# Patient Record
Sex: MALE | Race: OTHER | NOT HISPANIC OR LATINO
[De-identification: names, ages, dates, MRNs, and addresses within clinical notes are randomized per-mention and may not be internally consistent; named-entity substitution may affect disease eponyms.]

---

## 2018-11-05 PROBLEM — Z00.00 ENCOUNTER FOR PREVENTIVE HEALTH EXAMINATION: Status: ACTIVE | Noted: 2018-11-05

## 2018-11-16 DIAGNOSIS — Z87.891 PERSONAL HISTORY OF NICOTINE DEPENDENCE: ICD-10-CM

## 2018-11-16 DIAGNOSIS — Z87.74 PERSONAL HISTORY OF (CORRECTED) CONGENITAL MALFORMATIONS OF HEART AND CIRCULATORY SYSTEM: ICD-10-CM

## 2018-11-16 DIAGNOSIS — Q24.5 MALFORMATION OF CORONARY VESSELS: ICD-10-CM

## 2018-11-20 ENCOUNTER — APPOINTMENT (OUTPATIENT)
Dept: CARDIOTHORACIC SURGERY | Facility: CLINIC | Age: 63
End: 2018-11-20

## 2018-11-30 ENCOUNTER — APPOINTMENT (OUTPATIENT)
Dept: CARDIOTHORACIC SURGERY | Facility: CLINIC | Age: 63
End: 2018-11-30

## 2018-12-26 ENCOUNTER — APPOINTMENT (OUTPATIENT)
Dept: CARDIOTHORACIC SURGERY | Facility: CLINIC | Age: 63
End: 2018-12-26
Payer: COMMERCIAL

## 2018-12-26 VITALS
HEIGHT: 65 IN | HEART RATE: 98 BPM | SYSTOLIC BLOOD PRESSURE: 124 MMHG | DIASTOLIC BLOOD PRESSURE: 73 MMHG | WEIGHT: 147 LBS | RESPIRATION RATE: 16 BRPM | BODY MASS INDEX: 24.49 KG/M2 | OXYGEN SATURATION: 99 %

## 2018-12-26 DIAGNOSIS — Z80.9 FAMILY HISTORY OF MALIGNANT NEOPLASM, UNSPECIFIED: ICD-10-CM

## 2018-12-26 DIAGNOSIS — R07.9 CHEST PAIN, UNSPECIFIED: ICD-10-CM

## 2018-12-26 PROCEDURE — 99242 OFF/OP CONSLTJ NEW/EST SF 20: CPT

## 2018-12-26 RX ORDER — ANACARDIUM ORIENTALE, BELLADONNA, CACTUS GRANDIFLORUS, CALCAREA CARBONICA, CARBONEUM SULPHURATUM, CUPRUM ACETICUM, MAGNESIA PHOSPHORICA, SECALE CORNUTUM 6; 6; 6; 12; 10; 4; 8; 6 [HP_X]/ML; [HP_X]/ML; [HP_X]/ML; [HP_X]/ML; [HP_X]/ML; [HP_X]/ML; [HP_X]/ML; [HP_X]/ML
LIQUID ORAL
Refills: 0 | Status: ACTIVE | COMMUNITY

## 2019-01-16 ENCOUNTER — OUTPATIENT (OUTPATIENT)
Dept: OUTPATIENT SERVICES | Facility: HOSPITAL | Age: 64
LOS: 1 days | End: 2019-01-16
Payer: COMMERCIAL

## 2019-01-16 DIAGNOSIS — Q24.5 MALFORMATION OF CORONARY VESSELS: ICD-10-CM

## 2019-01-16 LAB
BUN SERPL-MCNC: 26 MG/DL — HIGH (ref 8–20)
CREAT SERPL-MCNC: 0.93 MG/DL — SIGNIFICANT CHANGE UP (ref 0.5–1.3)

## 2019-01-16 PROCEDURE — 75574 CT ANGIO HRT W/3D IMAGE: CPT | Mod: 26

## 2019-01-16 PROCEDURE — 93306 TTE W/DOPPLER COMPLETE: CPT | Mod: 26

## 2019-01-16 PROCEDURE — 84520 ASSAY OF UREA NITROGEN: CPT

## 2019-01-16 PROCEDURE — 93306 TTE W/DOPPLER COMPLETE: CPT

## 2019-01-16 PROCEDURE — 82565 ASSAY OF CREATININE: CPT

## 2019-01-16 PROCEDURE — 75574 CT ANGIO HRT W/3D IMAGE: CPT

## 2019-01-16 PROCEDURE — 36415 COLL VENOUS BLD VENIPUNCTURE: CPT

## 2019-08-27 ENCOUNTER — INPATIENT (INPATIENT)
Facility: HOSPITAL | Age: 64
LOS: 2 days | Discharge: HOME | DRG: 287 | End: 2019-08-30
Attending: THORACIC SURGERY (CARDIOTHORACIC VASCULAR SURGERY) | Admitting: THORACIC SURGERY (CARDIOTHORACIC VASCULAR SURGERY)
Payer: MEDICAID

## 2019-08-27 VITALS
TEMPERATURE: 98 F | RESPIRATION RATE: 20 BRPM | OXYGEN SATURATION: 98 % | HEART RATE: 68 BPM | HEIGHT: 65 IN | WEIGHT: 149.91 LBS | SYSTOLIC BLOOD PRESSURE: 145 MMHG | DIASTOLIC BLOOD PRESSURE: 95 MMHG

## 2019-08-27 DIAGNOSIS — Z98.890 OTHER SPECIFIED POSTPROCEDURAL STATES: Chronic | ICD-10-CM

## 2019-08-27 DIAGNOSIS — R07.9 CHEST PAIN, UNSPECIFIED: ICD-10-CM

## 2019-08-27 DIAGNOSIS — Q24.5 MALFORMATION OF CORONARY VESSELS: ICD-10-CM

## 2019-08-27 LAB
ALBUMIN SERPL ELPH-MCNC: 3.8 G/DL — SIGNIFICANT CHANGE UP (ref 3.3–5.2)
ALP SERPL-CCNC: 61 U/L — SIGNIFICANT CHANGE UP (ref 40–120)
ALT FLD-CCNC: 22 U/L — SIGNIFICANT CHANGE UP
ANION GAP SERPL CALC-SCNC: 11 MMOL/L — SIGNIFICANT CHANGE UP (ref 5–17)
APTT BLD: 33 SEC — SIGNIFICANT CHANGE UP (ref 27.5–36.3)
AST SERPL-CCNC: 32 U/L — SIGNIFICANT CHANGE UP
BASOPHILS # BLD AUTO: 0.02 K/UL — SIGNIFICANT CHANGE UP (ref 0–0.2)
BASOPHILS NFR BLD AUTO: 0.3 % — SIGNIFICANT CHANGE UP (ref 0–2)
BILIRUB SERPL-MCNC: 0.2 MG/DL — LOW (ref 0.4–2)
BUN SERPL-MCNC: 20 MG/DL — SIGNIFICANT CHANGE UP (ref 8–20)
CALCIUM SERPL-MCNC: 9.1 MG/DL — SIGNIFICANT CHANGE UP (ref 8.6–10.2)
CHLORIDE SERPL-SCNC: 104 MMOL/L — SIGNIFICANT CHANGE UP (ref 98–107)
CK SERPL-CCNC: 608 U/L — HIGH (ref 30–200)
CO2 SERPL-SCNC: 23 MMOL/L — SIGNIFICANT CHANGE UP (ref 22–29)
CREAT SERPL-MCNC: 0.96 MG/DL — SIGNIFICANT CHANGE UP (ref 0.5–1.3)
EOSINOPHIL # BLD AUTO: 0.18 K/UL — SIGNIFICANT CHANGE UP (ref 0–0.5)
EOSINOPHIL NFR BLD AUTO: 3 % — SIGNIFICANT CHANGE UP (ref 0–6)
GLUCOSE SERPL-MCNC: 89 MG/DL — SIGNIFICANT CHANGE UP (ref 70–115)
HCT VFR BLD CALC: 44.6 % — SIGNIFICANT CHANGE UP (ref 39–50)
HGB BLD-MCNC: 15 G/DL — SIGNIFICANT CHANGE UP (ref 13–17)
IMM GRANULOCYTES NFR BLD AUTO: 0.2 % — SIGNIFICANT CHANGE UP (ref 0–1.5)
INR BLD: 1.08 RATIO — SIGNIFICANT CHANGE UP (ref 0.88–1.16)
LYMPHOCYTES # BLD AUTO: 1.31 K/UL — SIGNIFICANT CHANGE UP (ref 1–3.3)
LYMPHOCYTES # BLD AUTO: 22.2 % — SIGNIFICANT CHANGE UP (ref 13–44)
MCHC RBC-ENTMCNC: 30.8 PG — SIGNIFICANT CHANGE UP (ref 27–34)
MCHC RBC-ENTMCNC: 33.6 GM/DL — SIGNIFICANT CHANGE UP (ref 32–36)
MCV RBC AUTO: 91.6 FL — SIGNIFICANT CHANGE UP (ref 80–100)
MONOCYTES # BLD AUTO: 0.61 K/UL — SIGNIFICANT CHANGE UP (ref 0–0.9)
MONOCYTES NFR BLD AUTO: 10.3 % — SIGNIFICANT CHANGE UP (ref 2–14)
NEUTROPHILS # BLD AUTO: 3.78 K/UL — SIGNIFICANT CHANGE UP (ref 1.8–7.4)
NEUTROPHILS NFR BLD AUTO: 64 % — SIGNIFICANT CHANGE UP (ref 43–77)
PLATELET # BLD AUTO: 232 K/UL — SIGNIFICANT CHANGE UP (ref 150–400)
POTASSIUM SERPL-MCNC: 4.1 MMOL/L — SIGNIFICANT CHANGE UP (ref 3.5–5.3)
POTASSIUM SERPL-SCNC: 4.1 MMOL/L — SIGNIFICANT CHANGE UP (ref 3.5–5.3)
PROT SERPL-MCNC: 7.3 G/DL — SIGNIFICANT CHANGE UP (ref 6.6–8.7)
PROTHROM AB SERPL-ACNC: 12.4 SEC — SIGNIFICANT CHANGE UP (ref 10–12.9)
RBC # BLD: 4.87 M/UL — SIGNIFICANT CHANGE UP (ref 4.2–5.8)
RBC # FLD: 13.1 % — SIGNIFICANT CHANGE UP (ref 10.3–14.5)
SODIUM SERPL-SCNC: 138 MMOL/L — SIGNIFICANT CHANGE UP (ref 135–145)
TROPONIN T SERPL-MCNC: <0.01 NG/ML — SIGNIFICANT CHANGE UP (ref 0–0.06)
WBC # BLD: 5.91 K/UL — SIGNIFICANT CHANGE UP (ref 3.8–10.5)
WBC # FLD AUTO: 5.91 K/UL — SIGNIFICANT CHANGE UP (ref 3.8–10.5)

## 2019-08-27 PROCEDURE — 99223 1ST HOSP IP/OBS HIGH 75: CPT

## 2019-08-27 PROCEDURE — 99285 EMERGENCY DEPT VISIT HI MDM: CPT

## 2019-08-27 PROCEDURE — 71046 X-RAY EXAM CHEST 2 VIEWS: CPT | Mod: 26

## 2019-08-27 PROCEDURE — 99221 1ST HOSP IP/OBS SF/LOW 40: CPT

## 2019-08-27 PROCEDURE — 93010 ELECTROCARDIOGRAM REPORT: CPT

## 2019-08-27 RX ORDER — SODIUM CHLORIDE 9 MG/ML
3 INJECTION INTRAMUSCULAR; INTRAVENOUS; SUBCUTANEOUS EVERY 8 HOURS
Refills: 0 | Status: DISCONTINUED | OUTPATIENT
Start: 2019-08-27 | End: 2019-08-30

## 2019-08-27 RX ORDER — PANTOPRAZOLE SODIUM 20 MG/1
40 TABLET, DELAYED RELEASE ORAL
Refills: 0 | Status: DISCONTINUED | OUTPATIENT
Start: 2019-08-27 | End: 2019-08-30

## 2019-08-27 RX ORDER — ASPIRIN/CALCIUM CARB/MAGNESIUM 324 MG
81 TABLET ORAL DAILY
Refills: 0 | Status: DISCONTINUED | OUTPATIENT
Start: 2019-08-27 | End: 2019-08-30

## 2019-08-27 RX ORDER — ATORVASTATIN CALCIUM 80 MG/1
40 TABLET, FILM COATED ORAL AT BEDTIME
Refills: 0 | Status: DISCONTINUED | OUTPATIENT
Start: 2019-08-27 | End: 2019-08-30

## 2019-08-27 RX ADMIN — ATORVASTATIN CALCIUM 40 MILLIGRAM(S): 80 TABLET, FILM COATED ORAL at 21:31

## 2019-08-27 RX ADMIN — Medication 81 MILLIGRAM(S): at 21:31

## 2019-08-27 NOTE — ED PROVIDER NOTE - CLINICAL SUMMARY MEDICAL DECISION MAKING FREE TEXT BOX
64 year old with more frequent chest pain noted to have potentially lethal cardiac anomaly sent by cardiologist.  Patient well appearing, troponin negative.  EKG non-ischemic.  Patient signed out to overnight physician pending CT surgery evaluation and dispo.

## 2019-08-27 NOTE — H&P ADULT - ASSESSMENT
64M h/o anomalous left coronary artery, noncompliance, c/o intermittent chest pain, sent from cardiology office today for preoperative workup and cardiac surgery evaluation.

## 2019-08-27 NOTE — H&P ADULT - PROBLEM SELECTOR PLAN 1
Preop workup (labs, CXR, carotid us, echo)  Cath tomorrow (NPO after midnight, T&S ordered)  Admit to Dr Whiteside  D/W Dr Whiteside

## 2019-08-27 NOTE — ED PROVIDER NOTE - PROGRESS NOTE DETAILS
CT surgery team paged. CT surgery team called and made aware. Seen by CT surgery and pt to be admitted

## 2019-08-27 NOTE — ED PROVIDER NOTE - OBJECTIVE STATEMENT
This patient is a 64 year old man who was sent to the ER by his cardiologist to be seen by cardiothoracic surgery.  Patient is reported to have a lethal cardiac anomaly.  He has been experiencing more frequent chest pain for the past 2 weeks last episode was last night.  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  The pain lasted for more than one hour.  He was seen by his cardiologist today and sent to the ER.  Currently in the ER he has no complaints.

## 2019-08-27 NOTE — H&P ADULT - NSHPSOCIALHISTORY_GEN_ALL_CORE
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

## 2019-08-27 NOTE — H&P ADULT - NSHPLABSRESULTS_GEN_ALL_CORE
15.0   5.91  )-----------( 232      ( 27 Aug 2019 18:51 )             44.6       08-27    138  |  104  |  20.0  ----------------------------<  89  4.1   |  23.0  |  0.96    Ca    9.1      27 Aug 2019 18:51    TPro  7.3  /  Alb  3.8  /  TBili  0.2<L>  /  DBili  x   /  AST  32  /  ALT  22  /  AlkPhos  61  08-27

## 2019-08-27 NOTE — H&P ADULT - HISTORY OF PRESENT ILLNESS
64M with a known history of an anomalous coronary artery since seen at [facility] in 2015 but at that time did not have insurance and was lost to follow up. In 2018, the patient saw Dr Morales and underwent testing and stress test and was recommended to undergo open heart surgery for an anomalous coronary artery but still did not have insurance and did not follow up. Patient again saw Dr Morales in the office today with complaints of occasional chest discomfort. Patient states it happens at times with exertion, last incident was 2 weeks ago, with central chest discomfort and palpitations, at time radiating to right upper chest, resolved with rest. 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 Patient willing and agreeable to admission and treatment at this time. Patient denies SOB, LE edema, N/V.

## 2019-08-27 NOTE — H&P ADULT - NSICDXPASTSURGICALHX_GEN_ALL_CORE_FT
PAST SURGICAL HISTORY:  H/O Spinal surgery Patient reports requiring surgery on 3 vertebrae following accident many years ago

## 2019-08-27 NOTE — ED ADULT NURSE REASSESSMENT NOTE - NS ED NURSE REASSESS COMMENT FT1
Report received from day RN Pt A&Ox4 c/o chest discomfort and sob upon exertion at this time. lung sounds clear through out. talking in complete sentences with out difficulty. breathing unlabored. Pt resting comfortably, VSS, no signs of distress at this time, CM in place,  safety maintained, call bell in reach.

## 2019-08-27 NOTE — ED ADULT TRIAGE NOTE - CHIEF COMPLAINT QUOTE
sent for CHF evaluation, no CP now, no dyspnea, sent in since pt is non-compliant. Chest pain yesterday. was to see CT surgery

## 2019-08-27 NOTE — H&P ADULT - ATTENDING COMMENTS
Above H& P reviewed and independently verified. I had seen him in office in December 2018, but he didn't follow up since then. He presented to ER with chest pain. 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    The treatment plan  would be further discussed with him after coronary angiogram.    The plan was discussed with him in detail, and all questions were answered.

## 2019-08-27 NOTE — ED ADULT NURSE NOTE - NSIMPLEMENTINTERV_GEN_ALL_ED
Telephone Encounter by Norma Rivera RN, BSN at 10/12/18 01:11 PM     Author:  Norma Rivera RN, BSN Service:  (none) Author Type:  Registered Nurse     Filed:  10/12/18 01:27 PM Encounter Date:  10/12/2018 Status:  Addendum     :  Norma Rivera RN, BSN (Registered Nurse)            Last visit with optometry was 10/27/2018. Referral was previously done by Dr. Rico Padilla. Forwarding to Opthalmology pool[HS1.1M]. [HS1.2M]      Revision History        User Key Date/Time User Provider Type Action    > HS1.2 10/12/18 01:27 PM Norma Rivera RN, BSN Registered Nurse Addend     HS1.1 10/12/18 01:12 PM Norma Rivera RN, BSN Registered Nurse Sign    M - Manual Implemented All Universal Safety Interventions:  [place] to call system. Call bell, personal items and telephone within reach. Instruct patient to call for assistance. Room bathroom lighting operational. Non-slip footwear when patient is off stretcher. Physically safe environment: no spills, clutter or unnecessary equipment. Stretcher in lowest position, wheels locked, appropriate side rails in place.

## 2019-08-27 NOTE — H&P ADULT - NEGATIVE CARDIOVASCULAR SYMPTOMS
no peripheral edema/no claudication/no dyspnea on exertion/no paroxysmal nocturnal dyspnea/no orthopnea

## 2019-08-28 DIAGNOSIS — I20.8 OTHER FORMS OF ANGINA PECTORIS: ICD-10-CM

## 2019-08-28 DIAGNOSIS — Z29.9 ENCOUNTER FOR PROPHYLACTIC MEASURES, UNSPECIFIED: ICD-10-CM

## 2019-08-28 LAB
ALBUMIN SERPL ELPH-MCNC: 3.7 G/DL — SIGNIFICANT CHANGE UP (ref 3.3–5.2)
ALP SERPL-CCNC: 66 U/L — SIGNIFICANT CHANGE UP (ref 40–120)
ALT FLD-CCNC: 20 U/L — SIGNIFICANT CHANGE UP
ANION GAP SERPL CALC-SCNC: 11 MMOL/L — SIGNIFICANT CHANGE UP (ref 5–17)
APPEARANCE UR: CLEAR — SIGNIFICANT CHANGE UP
AST SERPL-CCNC: 26 U/L — SIGNIFICANT CHANGE UP
BASOPHILS # BLD AUTO: 0.02 K/UL — SIGNIFICANT CHANGE UP (ref 0–0.2)
BASOPHILS NFR BLD AUTO: 0.3 % — SIGNIFICANT CHANGE UP (ref 0–2)
BILIRUB SERPL-MCNC: 0.5 MG/DL — SIGNIFICANT CHANGE UP (ref 0.4–2)
BILIRUB UR-MCNC: NEGATIVE — SIGNIFICANT CHANGE UP
BUN SERPL-MCNC: 16 MG/DL — SIGNIFICANT CHANGE UP (ref 8–20)
CALCIUM SERPL-MCNC: 8.4 MG/DL — LOW (ref 8.6–10.2)
CHLORIDE SERPL-SCNC: 104 MMOL/L — SIGNIFICANT CHANGE UP (ref 98–107)
CK MB CFR SERPL CALC: 5.8 NG/ML — SIGNIFICANT CHANGE UP (ref 0–6.7)
CK SERPL-CCNC: 505 U/L — HIGH (ref 30–200)
CO2 SERPL-SCNC: 24 MMOL/L — SIGNIFICANT CHANGE UP (ref 22–29)
COLOR SPEC: YELLOW — SIGNIFICANT CHANGE UP
CREAT SERPL-MCNC: 1 MG/DL — SIGNIFICANT CHANGE UP (ref 0.5–1.3)
DIFF PNL FLD: NEGATIVE — SIGNIFICANT CHANGE UP
EOSINOPHIL # BLD AUTO: 0.26 K/UL — SIGNIFICANT CHANGE UP (ref 0–0.5)
EOSINOPHIL NFR BLD AUTO: 4.1 % — SIGNIFICANT CHANGE UP (ref 0–6)
GLUCOSE SERPL-MCNC: 90 MG/DL — SIGNIFICANT CHANGE UP (ref 70–115)
GLUCOSE UR QL: NEGATIVE MG/DL — SIGNIFICANT CHANGE UP
HBA1C BLD-MCNC: 4.9 % — SIGNIFICANT CHANGE UP (ref 4–5.6)
HCT VFR BLD CALC: 46.6 % — SIGNIFICANT CHANGE UP (ref 39–50)
HCV AB S/CO SERPL IA: 0.19 S/CO — SIGNIFICANT CHANGE UP (ref 0–0.99)
HCV AB SERPL-IMP: SIGNIFICANT CHANGE UP
HGB BLD-MCNC: 15.3 G/DL — SIGNIFICANT CHANGE UP (ref 13–17)
HIV 1 & 2 AB SERPL IA.RAPID: SIGNIFICANT CHANGE UP
IMM GRANULOCYTES NFR BLD AUTO: 0.2 % — SIGNIFICANT CHANGE UP (ref 0–1.5)
KETONES UR-MCNC: NEGATIVE — SIGNIFICANT CHANGE UP
LEUKOCYTE ESTERASE UR-ACNC: NEGATIVE — SIGNIFICANT CHANGE UP
LYMPHOCYTES # BLD AUTO: 1.37 K/UL — SIGNIFICANT CHANGE UP (ref 1–3.3)
LYMPHOCYTES # BLD AUTO: 21.7 % — SIGNIFICANT CHANGE UP (ref 13–44)
MCHC RBC-ENTMCNC: 30.3 PG — SIGNIFICANT CHANGE UP (ref 27–34)
MCHC RBC-ENTMCNC: 32.8 GM/DL — SIGNIFICANT CHANGE UP (ref 32–36)
MCV RBC AUTO: 92.3 FL — SIGNIFICANT CHANGE UP (ref 80–100)
MONOCYTES # BLD AUTO: 0.62 K/UL — SIGNIFICANT CHANGE UP (ref 0–0.9)
MONOCYTES NFR BLD AUTO: 9.8 % — SIGNIFICANT CHANGE UP (ref 2–14)
MRSA PCR RESULT.: SIGNIFICANT CHANGE UP
NEUTROPHILS # BLD AUTO: 4.03 K/UL — SIGNIFICANT CHANGE UP (ref 1.8–7.4)
NEUTROPHILS NFR BLD AUTO: 63.9 % — SIGNIFICANT CHANGE UP (ref 43–77)
NITRITE UR-MCNC: NEGATIVE — SIGNIFICANT CHANGE UP
NT-PROBNP SERPL-SCNC: 20 PG/ML — SIGNIFICANT CHANGE UP (ref 0–300)
PH UR: 6 — SIGNIFICANT CHANGE UP (ref 5–8)
PLATELET # BLD AUTO: 242 K/UL — SIGNIFICANT CHANGE UP (ref 150–400)
POTASSIUM SERPL-MCNC: 4.2 MMOL/L — SIGNIFICANT CHANGE UP (ref 3.5–5.3)
POTASSIUM SERPL-SCNC: 4.2 MMOL/L — SIGNIFICANT CHANGE UP (ref 3.5–5.3)
PREALB SERPL-MCNC: 19 MG/DL — SIGNIFICANT CHANGE UP (ref 18–38)
PROT SERPL-MCNC: 7 G/DL — SIGNIFICANT CHANGE UP (ref 6.6–8.7)
PROT UR-MCNC: NEGATIVE MG/DL — SIGNIFICANT CHANGE UP
RBC # BLD: 5.05 M/UL — SIGNIFICANT CHANGE UP (ref 4.2–5.8)
RBC # FLD: 13.1 % — SIGNIFICANT CHANGE UP (ref 10.3–14.5)
S AUREUS DNA NOSE QL NAA+PROBE: SIGNIFICANT CHANGE UP
SODIUM SERPL-SCNC: 139 MMOL/L — SIGNIFICANT CHANGE UP (ref 135–145)
SP GR SPEC: 1.01 — SIGNIFICANT CHANGE UP (ref 1.01–1.02)
T4 FREE SERPL-MCNC: 1.2 NG/DL — SIGNIFICANT CHANGE UP (ref 0.9–1.8)
TROPONIN T SERPL-MCNC: <0.01 NG/ML — SIGNIFICANT CHANGE UP (ref 0–0.06)
TSH SERPL-MCNC: 0.8 UIU/ML — SIGNIFICANT CHANGE UP (ref 0.27–4.2)
UROBILINOGEN FLD QL: NEGATIVE MG/DL — SIGNIFICANT CHANGE UP
WBC # BLD: 6.31 K/UL — SIGNIFICANT CHANGE UP (ref 3.8–10.5)
WBC # FLD AUTO: 6.31 K/UL — SIGNIFICANT CHANGE UP (ref 3.8–10.5)

## 2019-08-28 PROCEDURE — 99152 MOD SED SAME PHYS/QHP 5/>YRS: CPT

## 2019-08-28 PROCEDURE — 99232 SBSQ HOSP IP/OBS MODERATE 35: CPT

## 2019-08-28 PROCEDURE — 93458 L HRT ARTERY/VENTRICLE ANGIO: CPT | Mod: 26

## 2019-08-28 RX ORDER — CHLORHEXIDINE GLUCONATE 213 G/1000ML
15 SOLUTION TOPICAL
Refills: 0 | Status: DISCONTINUED | OUTPATIENT
Start: 2019-08-28 | End: 2019-08-30

## 2019-08-28 RX ORDER — DOCUSATE SODIUM 100 MG
100 CAPSULE ORAL THREE TIMES A DAY
Refills: 0 | Status: DISCONTINUED | OUTPATIENT
Start: 2019-08-28 | End: 2019-08-30

## 2019-08-28 RX ORDER — CHLORHEXIDINE GLUCONATE 213 G/1000ML
1 SOLUTION TOPICAL
Refills: 0 | Status: DISCONTINUED | OUTPATIENT
Start: 2019-08-28 | End: 2019-08-30

## 2019-08-28 RX ADMIN — PANTOPRAZOLE SODIUM 40 MILLIGRAM(S): 20 TABLET, DELAYED RELEASE ORAL at 05:33

## 2019-08-28 RX ADMIN — Medication 100 MILLIGRAM(S): at 21:49

## 2019-08-28 RX ADMIN — CHLORHEXIDINE GLUCONATE 1 APPLICATION(S): 213 SOLUTION TOPICAL at 21:41

## 2019-08-28 RX ADMIN — SODIUM CHLORIDE 3 MILLILITER(S): 9 INJECTION INTRAMUSCULAR; INTRAVENOUS; SUBCUTANEOUS at 05:33

## 2019-08-28 RX ADMIN — ATORVASTATIN CALCIUM 40 MILLIGRAM(S): 80 TABLET, FILM COATED ORAL at 21:49

## 2019-08-28 RX ADMIN — SODIUM CHLORIDE 3 MILLILITER(S): 9 INJECTION INTRAMUSCULAR; INTRAVENOUS; SUBCUTANEOUS at 00:00

## 2019-08-28 RX ADMIN — SODIUM CHLORIDE 3 MILLILITER(S): 9 INJECTION INTRAMUSCULAR; INTRAVENOUS; SUBCUTANEOUS at 21:55

## 2019-08-28 RX ADMIN — Medication 81 MILLIGRAM(S): at 09:01

## 2019-08-28 RX ADMIN — SODIUM CHLORIDE 3 MILLILITER(S): 9 INJECTION INTRAMUSCULAR; INTRAVENOUS; SUBCUTANEOUS at 10:31

## 2019-08-28 RX ADMIN — CHLORHEXIDINE GLUCONATE 15 MILLILITER(S): 213 SOLUTION TOPICAL at 21:49

## 2019-08-28 NOTE — PROGRESS NOTE ADULT - PROBLEM SELECTOR PLAN 1
Cardiac surgery pre-operative work up ongoing  TTE completed and is pending read  Carotid US b/l ordered and pending   Spirometry completed  A1C resulted, patient is non-diabetic  Pending cath results today  Preop orders for OR on 8/30 in place

## 2019-08-28 NOTE — PROGRESS NOTE ADULT - SUBJECTIVE AND OBJECTIVE BOX
Subjective:  I need to have this before Surgery on my heart   Pt reports pain inspiration   No active chest pain     Mallampati 2 ASA2   BRA 1.4 %  GFR 65   Medications:  aspirin enteric coated 81 milliGRAM(s) Oral daily  atorvastatin 40 milliGRAM(s) Oral at bedtime  chlorhexidine 0.12% Liquid 15 milliLiter(s) Swish and Spit two times a day  chlorhexidine 4% Liquid 1 Application(s) Topical two times a day  docusate sodium 100 milliGRAM(s) Oral three times a day  pantoprazole    Tablet 40 milliGRAM(s) Oral before breakfast  sodium chloride 0.9% lock flush 3 milliLiter(s) IV Push every 8 hours      PHYSICAL EXAM:  Vital Signs Last 24 Hrs  T(C): 36.9 (28 Aug 2019 09:31), Max: 36.9 (27 Aug 2019 22:00)  T(F): 98.4 (28 Aug 2019 09:31), Max: 98.4 (27 Aug 2019 22:00)  HR: 64 (28 Aug 2019 09:31) (56 - 86)  BP: 157/91 (28 Aug 2019 09:31) (132/90 - 157/91)  BP(mean): 108 (27 Aug 2019 19:40) (108 - 108)  RR: 16 (28 Aug 2019 09:31) (16 - 20)  SpO2: 98% (28 Aug 2019 09:31) (97% - 99%)  Daily Height in cm: 165.1 (28 Aug 2019 01:15)    Daily Weight in k (28 Aug 2019 05:09)  I&O's Detail    27 Aug 2019 07:01  -  28 Aug 2019 07:00  --------------------------------------------------------  IN:  Total IN: 0 mL    OUT:    Voided: 350 mL  Total OUT: 350 mL    Total NET: -350 mL          General: A/ox 3, No acute Distress  Neck: Supple, NO JVD  Cardiac: S1 S2, No M/R/G  Pulmonary: CTAB, Breathing unlabored, No Rhonchi/Rales/Wheezing  Abdomen: Soft, Non -tender, +BS   Extremities: No Rashes, No edema  Neuro: A/o x 3, No focal deficits  Psch: normal mood , normal affect    LABS:                          15.3   6.31  )-----------( 242      ( 28 Aug 2019 06:05 )             46.6         139  |  104  |  16.0  ----------------------------<  90  4.2   |  24.0  |  1.00    Ca    8.4<L>      28 Aug 2019 06:05    TPro  7.0  /  Alb  3.7  /  TBili  0.5  /  DBili  x   /  AST  26  /  ALT  20  /  AlkPhos  66  08-    PT/INR - ( 27 Aug 2019 18:50 )   PT: 12.4 sec;   INR: 1.08 ratio         PTT - ( 27 Aug 2019 18:50 )  PTT:33.0 sec    < from: TTE Echo Complete w/Doppler (19 @ 11:50) >   1. Normal left ventricular systolic function. Left ventricular ejection   fraction, by visual estimation, is 55 to 60%.   2. Normal right ventricular size and function.   3. Mild tricuspid regurgitation.   4. Possible anomalous coronary artery. Patient had cardiac CTA today   which demonstrated both left main coronary artery and right coronary   artery arising anteriorly from right coronary cusp.   5. There is no evidence of pericardial effusion.   6. ** No prior echocardiograms available for comparison.

## 2019-08-28 NOTE — PROGRESS NOTE ADULT - ASSESSMENT
64 year old male with PMH of spinal surgery and known anomalous left coronary artery since 2015 presents to [facility] on 8/27/19 after being sent in to the ER by cardiologist Dr. Morales. He has history of medical incompliance due to insurance issues and has been experiencing occasional central chest discomfort radiating to right chest and worsened with exertion.  Pt with need fro surgical intervention of anomalous artery   Presents now for [facility]   Currently feels well   s/p C tolerated well   Non- obstructive disease   Coronary  anomaly  present noted ( official report to follow )   a/p Post procedure orders and observations   Groin precautions   Bedrest for 3 hour   May sit up in2 hours   CV S following for future surgical intervention of anomaly    reviewed with patient results of procedure

## 2019-08-28 NOTE — PROGRESS NOTE ADULT - ASSESSMENT
64 year old male with PMH of spinal surgery and known anomalous left coronary artery since 2015 presents to [facility] on 8/27/19 after being sent in to the ER by cardiologist Dr. Morales. He has history of medical incompliance due to insurance issues and has been experiencing occasional central chest discomfort radiating to right chest and worsened with exertion.  Pt with need fro surgical intervention of anomalous artery   Presents now for [facility]   Currently feels well   PRE-PROCEDURE ASSESSMENT  [facility]   -Patient seen and examined  -Labs reviewed  -Pre-procedure teaching completed with patient and family  -Informed consent witnessed  -Questions answered- Pt given ASA today

## 2019-08-28 NOTE — PROGRESS NOTE ADULT - SUBJECTIVE AND OBJECTIVE BOX
Subjective:  Pt s/p LHC via RFA tolerated well   No c/o pain or SOB     Medications:  aspirin enteric coated 81 milliGRAM(s) Oral daily  atorvastatin 40 milliGRAM(s) Oral at bedtime  chlorhexidine 0.12% Liquid 15 milliLiter(s) Swish and Spit two times a day  chlorhexidine 4% Liquid 1 Application(s) Topical two times a day  docusate sodium 100 milliGRAM(s) Oral three times a day  pantoprazole    Tablet 40 milliGRAM(s) Oral before breakfast  sodium chloride 0.9% lock flush 3 milliLiter(s) IV Push every 8 hours      PHYSICAL EXAM:  Vital Signs Last 24 Hrs  T(C): 36.9 (28 Aug 2019 09:31), Max: 36.9 (27 Aug 2019 22:00)  T(F): 98.4 (28 Aug 2019 09:31), Max: 98.4 (27 Aug 2019 22:00)  HR: 51 (28 Aug 2019 11:40) (51 - 86)  BP: 103/69 (28 Aug 2019 11:40) (103/69 - 157/91)  BP(mean): 108 (27 Aug 2019 19:40) (108 - 108)  RR: 16 (28 Aug 2019 11:25) (16 - 20)  SpO2: 98% (28 Aug 2019 11:25) (97% - 99%)  Daily Height in cm: 165.1 (28 Aug 2019 01:15)    Daily Weight in k (28 Aug 2019 05:09)        General: A/ox 3, No acute Distress  Neck: Supple, NO JVD  Cardiac: S1 S2, No M/R/G  Pulmonary: CTAB, Breathing unlabored, No Rhonchi/Rales/Wheezing  Abdomen: Soft, Non -tender, +BS   Extremities: No Rashes, No edema  R femoral sheath d/c by RN site soft non tender no evidence  of bleeding  Neuro: A/o x 3, No focal deficits  Psch: normal mood , normal affect    LABS:                          15.3   6.31  )-----------( 242      ( 28 Aug 2019 06:05 )             46.6     -    139  |  104  |  16.0  ----------------------------<  90  4.2   |  24.0  |  1.00    Ca    8.4<L>      28 Aug 2019 06:05    TPro  7.0  /  Alb  3.7  /  TBili  0.5  /  DBili  x   /  AST  26  /  ALT  20  /  AlkPhos  66  08-28    PT/INR - ( 27 Aug 2019 18:50 )   PT: 12.4 sec;   INR: 1.08 ratio         PTT - ( 27 Aug 2019 18:50 )  PTT:33.0 sec

## 2019-08-28 NOTE — PROGRESS NOTE ADULT - SUBJECTIVE AND OBJECTIVE BOX
Brief summary:  64 year old male with PMH of spinal surgery and known anomalous left coronary artery since  presents to [facility] on 19 after being sent in to the ER by cardiologist Dr. Morales. He has history of medical incompliance due to insurance issues and has been experiencing occasional central chest discomfort radiating to right chest and worsened with exertion.    Overnight events:  No acute events overnight. Patient denies acute pain with radiating or aggravating factors. He denies chest pain, shortness of breath, palpitations, headache, dizziness, nausea, or vomiting.     PAST MEDICAL & SURGICAL HISTORY:  Anomalous left coronary artery  H/O Spinal surgery: Patient reports requiring surgery on 3 vertebrae following accident many years ago    Medications:  aspirin enteric coated 81 milliGRAM(s) Oral daily  atorvastatin 40 milliGRAM(s) Oral at bedtime  chlorhexidine 0.12% Liquid 15 milliLiter(s) Swish and Spit two times a day  chlorhexidine 4% Liquid 1 Application(s) Topical two times a day  docusate sodium 100 milliGRAM(s) Oral three times a day  pantoprazole    Tablet 40 milliGRAM(s) Oral before breakfast  sodium chloride 0.9% lock flush 3 milliLiter(s) IV Push every 8 hours  MEDICATIONS  (PRN):    Height (cm): 165.1 ( @ 01:15)  Weight (kg): 68 ( @ 01:15)  BMI (kg/m2): 24.9 ( @ 01:15)  BSA (m2): 1.75 ( @ 01:15)  Daily Height in cm: 165.1 (28 Aug 2019 01:15)    Daily Weight in k (28 Aug 2019 05:09)                        15.3   6.31  )-----------( 242      ( 28 Aug 2019 06:05 )             46.6       139  |  104  |  16.0  ----------------------------<  90  4.2   |  24.0  |  1.00    Ca    8.4<L>      28 Aug 2019 06:05    TPro  7.0  /  Alb  3.7  /  TBili  0.5  /  DBili  x   /  AST  26  /  ALT  20  /  AlkPhos  66      CARDIAC MARKERS ( 28 Aug 2019 06:05 )  x     / <0.01 ng/mL / 505 U/L / x     / 5.8 ng/mL  CARDIAC MARKERS ( 27 Aug 2019 18:51 )  x     / <0.01 ng/mL / 608 U/L / x     / 5.8 ng/mL    PT/INR - ( 27 Aug 2019 18:50 )   PT: 12.4 sec;   INR: 1.08 ratio  PTT - ( 27 Aug 2019 18:50 )  PTT:33.0 sec    Objective:  T(C): 36.7 (19 @ 05:18), Max: 36.9 (19 @ 22:00)  HR: 56 (19 @ 08:25) (56 - 86)  BP: 133/94 (19 @ 05:18) (133/94 - 154/86)  RR: 19 (19 @ 08:25) (16 - 20)  SpO2: 99% (19 @ 08:25) (97% - 99%)  Wt(kg): --CAPILLARY BLOOD GLUCOSE      I&O's Summary    27 Aug 2019 07:01  -  28 Aug 2019 07:00  --------------------------------------------------------  IN: 0 mL / OUT: 350 mL / NET: -350 mL      Physical Exam  Neuro: A+O x 3, non-focal, speech clear and intact  Pulm: CTA, equal bilaterally  CV: RRR, no murmurs, +S1S2  Abd: soft, NT, ND, +BS  Ext: +DP Pulses b/l, +PT pulses, no edema

## 2019-08-28 NOTE — PROGRESS NOTE ADULT - ASSESSMENT
64 year old male with PMH of spinal surgery and known anomalous left coronary artery since 2015 presents to [facility] on 8/27/19 after being sent in to the ER by cardiologist Dr. Morales. Patient is now undergoing preoperative work up for tentative surgical date of 8/30/19 with Dr. Whiteside. Today, patient will undergo cath to further evaluate for coronary artery disease.

## 2019-08-29 DIAGNOSIS — Q24.9 CONGENITAL MALFORMATION OF HEART, UNSPECIFIED: ICD-10-CM

## 2019-08-29 DIAGNOSIS — Z98.890 OTHER SPECIFIED POSTPROCEDURAL STATES: ICD-10-CM

## 2019-08-29 LAB
ANION GAP SERPL CALC-SCNC: 9 MMOL/L — SIGNIFICANT CHANGE UP (ref 5–17)
BLD GP AB SCN SERPL QL: SIGNIFICANT CHANGE UP
BUN SERPL-MCNC: 27 MG/DL — HIGH (ref 8–20)
CALCIUM SERPL-MCNC: 9.2 MG/DL — SIGNIFICANT CHANGE UP (ref 8.6–10.2)
CHLORIDE SERPL-SCNC: 101 MMOL/L — SIGNIFICANT CHANGE UP (ref 98–107)
CHOLEST SERPL-MCNC: 184 MG/DL — SIGNIFICANT CHANGE UP (ref 110–199)
CO2 SERPL-SCNC: 26 MMOL/L — SIGNIFICANT CHANGE UP (ref 22–29)
CREAT SERPL-MCNC: 1.18 MG/DL — SIGNIFICANT CHANGE UP (ref 0.5–1.3)
GLUCOSE SERPL-MCNC: 95 MG/DL — SIGNIFICANT CHANGE UP (ref 70–115)
HCT VFR BLD CALC: 50.3 % — HIGH (ref 39–50)
HDLC SERPL-MCNC: 45 MG/DL — SIGNIFICANT CHANGE UP
HGB BLD-MCNC: 16.7 G/DL — SIGNIFICANT CHANGE UP (ref 13–17)
LIPID PNL WITH DIRECT LDL SERPL: 117 MG/DL — SIGNIFICANT CHANGE UP
MAGNESIUM SERPL-MCNC: 1.9 MG/DL — SIGNIFICANT CHANGE UP (ref 1.6–2.6)
MCHC RBC-ENTMCNC: 30.6 PG — SIGNIFICANT CHANGE UP (ref 27–34)
MCHC RBC-ENTMCNC: 33.2 GM/DL — SIGNIFICANT CHANGE UP (ref 32–36)
MCV RBC AUTO: 92.3 FL — SIGNIFICANT CHANGE UP (ref 80–100)
PLATELET # BLD AUTO: 235 K/UL — SIGNIFICANT CHANGE UP (ref 150–400)
POTASSIUM SERPL-MCNC: 4.8 MMOL/L — SIGNIFICANT CHANGE UP (ref 3.5–5.3)
POTASSIUM SERPL-SCNC: 4.8 MMOL/L — SIGNIFICANT CHANGE UP (ref 3.5–5.3)
RBC # BLD: 5.45 M/UL — SIGNIFICANT CHANGE UP (ref 4.2–5.8)
RBC # FLD: 13.2 % — SIGNIFICANT CHANGE UP (ref 10.3–14.5)
SODIUM SERPL-SCNC: 136 MMOL/L — SIGNIFICANT CHANGE UP (ref 135–145)
TOTAL CHOLESTEROL/HDL RATIO MEASUREMENT: 4 RATIO — SIGNIFICANT CHANGE UP (ref 3.4–9.6)
TRIGL SERPL-MCNC: 109 MG/DL — SIGNIFICANT CHANGE UP (ref 10–200)
WBC # BLD: 7.39 K/UL — SIGNIFICANT CHANGE UP (ref 3.8–10.5)
WBC # FLD AUTO: 7.39 K/UL — SIGNIFICANT CHANGE UP (ref 3.8–10.5)

## 2019-08-29 PROCEDURE — 71045 X-RAY EXAM CHEST 1 VIEW: CPT | Mod: 26

## 2019-08-29 PROCEDURE — 99232 SBSQ HOSP IP/OBS MODERATE 35: CPT

## 2019-08-29 PROCEDURE — 93880 EXTRACRANIAL BILAT STUDY: CPT | Mod: 26

## 2019-08-29 RX ADMIN — CHLORHEXIDINE GLUCONATE 1 APPLICATION(S): 213 SOLUTION TOPICAL at 10:37

## 2019-08-29 RX ADMIN — ATORVASTATIN CALCIUM 40 MILLIGRAM(S): 80 TABLET, FILM COATED ORAL at 21:30

## 2019-08-29 RX ADMIN — Medication 81 MILLIGRAM(S): at 08:40

## 2019-08-29 RX ADMIN — CHLORHEXIDINE GLUCONATE 15 MILLILITER(S): 213 SOLUTION TOPICAL at 18:12

## 2019-08-29 RX ADMIN — CHLORHEXIDINE GLUCONATE 15 MILLILITER(S): 213 SOLUTION TOPICAL at 06:58

## 2019-08-29 RX ADMIN — SODIUM CHLORIDE 3 MILLILITER(S): 9 INJECTION INTRAMUSCULAR; INTRAVENOUS; SUBCUTANEOUS at 10:00

## 2019-08-29 RX ADMIN — Medication 100 MILLIGRAM(S): at 06:59

## 2019-08-29 RX ADMIN — SODIUM CHLORIDE 3 MILLILITER(S): 9 INJECTION INTRAMUSCULAR; INTRAVENOUS; SUBCUTANEOUS at 21:29

## 2019-08-29 RX ADMIN — SODIUM CHLORIDE 3 MILLILITER(S): 9 INJECTION INTRAMUSCULAR; INTRAVENOUS; SUBCUTANEOUS at 06:55

## 2019-08-29 RX ADMIN — PANTOPRAZOLE SODIUM 40 MILLIGRAM(S): 20 TABLET, DELAYED RELEASE ORAL at 06:58

## 2019-08-29 RX ADMIN — CHLORHEXIDINE GLUCONATE 1 APPLICATION(S): 213 SOLUTION TOPICAL at 20:59

## 2019-08-29 NOTE — PROGRESS NOTE ADULT - SUBJECTIVE AND OBJECTIVE BOX
Subjective: "I feel ok" Denies chest pain or sob. resting comfortably in bed    VITAL SIGNS  Vital Signs Last 24 Hrs  T(C): 36.7 (19 @ 10:18), Max: 36.9 (19 @ 15:00)  T(F): 98 (19 @ 10:18), Max: 98.4 (19 @ 15:00)  HR: 82 (19 @ 10:18) (50 - 100)  BP: 119/71 (19 @ 10:18) (96/66 - 119/71)  RR: 18 (19 @ 10:18) (16 - 18)  SpO2: 99% (19 @ 10:18) (97% - 99%)                Telemetry/Alarms:  SR  LVEF: normal    MEDICATIONS  aspirin enteric coated 81 milliGRAM(s) Oral daily  atorvastatin 40 milliGRAM(s) Oral at bedtime  chlorhexidine 0.12% Liquid 15 milliLiter(s) Swish and Spit two times a day  chlorhexidine 4% Liquid 1 Application(s) Topical two times a day  docusate sodium 100 milliGRAM(s) Oral three times a day  pantoprazole    Tablet 40 milliGRAM(s) Oral before breakfast  sodium chloride 0.9% lock flush 3 milliLiter(s) IV Push every 8 hours      PHYSICAL EXAM  General: well nourished, well developed, no acute distress  Neurology: alert and oriented x 3, nonfocal, no gross deficits  Respiratory: clear to auscultation bilaterally  CV: regular rate and rhythm, normal S1, S2  Abdomen: soft, nontender, nondistended, positive bowel sounds  Extremities: warm, well perfused. no edema. + DP pulses       @ 07:01  -   @ 07:00  --------------------------------------------------------  IN: 480 mL / OUT: 0 mL / NET: 480 mL        Weights:  Daily     Daily Weight in k.4 (29 Aug 2019 05:19)  Admit Wt: Drug Dosing Weight  Height (cm): 165.1 (28 Aug 2019 01:15)  Weight (kg): 68 (28 Aug 2019 01:15)  BMI (kg/m2): 24.9 (28 Aug 2019 01:15)  BSA (m2): 1.75 (28 Aug 2019 01:15)    All laboratory results, radiology and medications reviewed.    LABS      136  |  101  |  27.0<H>  ----------------------------<  95  4.8   |  26.0  |  1.18    Ca    9.2      29 Aug 2019 05:50  Mg     1.9         TPro  7.0  /  Alb  3.7  /  TBili  0.5  /  DBili  x   /  AST  26  /  ALT  20  /  AlkPhos  66                                   16.7   7.39  )-----------( 235      ( 29 Aug 2019 05:50 )             50.3          PT/INR - ( 27 Aug 2019 18:50 )   PT: 12.4 sec;   INR: 1.08 ratio         PTT - ( 27 Aug 2019 18:50 )  PTT:33.0 sec    CAPILLARY BLOOD GLUCOSE               Today's CXR: < from: Xray Chest 1 View- PORTABLE-Routine (19 @ 05:04) >    Findings:  Lines: None    Heart/Mediastinum/Lungs: The heart size is normal.The lungs are   clear.There are no pleural effusions.    Impression:    Clear lungs.      < end of copied text >      Today's EKG:< from: 12 Lead ECG (19 @ 17:32) >  Normal sinus rhythm  Normal ECG    < end of copied text >    < from: TTE Echo w/Cont Complete (19 @ 22:13) >    Summary:   1. Left ventricular ejection fraction, by visual estimation, is 65 to   70%.   2. Normal global left ventricular systolic function.   3. Spectral Doppler shows impaired relaxation pattern of left   ventricular myocardial filling (Grade I diastolic dysfunction).   4. Mild mitral annular calcification.   5. Thickening of the anterior and posterior mitral valve leaflets.   6. Sclerotic aortic valve with normal opening.   7. Dilatation of the aortic root and ascending aorta.   8. Dilated Ao root at 3.9cm. Dilated Asc Ao at 3.7cm. Consider CT or MRI   for a more accurate assessment if clinically indicated.    MD Cr Electronically signed on 2019 at 10:57:11 AM    < end of copied text >    PAST MEDICAL & SURGICAL HISTORY:  Anomalous left coronary artery  H/O Spinal surgery: Patient reports requiring surgery on 3 vertebrae following accident many years ago

## 2019-08-29 NOTE — PROGRESS NOTE ADULT - ASSESSMENT
64 year old male with PMH of spinal surgery and known anomalous left coronary artery since 2015 presents to [facility] on 8/27/19 after being sent in to the ER by cardiologist Dr. Morales. Patient is now undergoing preoperative work up for tentative surgical date of 8/30/19 with Dr. Whiteside. 8/28 cath: nonobstructive coronary disease. Echo: normal EF, dilated aortic root (3.9 cm)    Dr Whiteside to consult with Dr Flores regarding best plan of action surgically for this patient.

## 2019-08-29 NOTE — PROGRESS NOTE ADULT - PROBLEM SELECTOR PLAN 1
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   CTS consult pending

## 2019-08-29 NOTE — PROGRESS NOTE ADULT - ASSESSMENT
This is a 64M with a known history of an anomalous coronary artery since seen at [facility] in 2015 but at that time did not have insurance and was lost to follow up. In 2018, the patient saw Dr Morales and underwent testing and stress test and was recommended to undergo open heart surgery for an anomalous coronary artery but still did not have insurance and did not follow up. Patient again saw Dr Morales in the office recently with complaints of occasional chest discomfort. Patient states it happens at times with exertion, last incident was 2 weeks ago, with central chest discomfort and palpitations, at time radiating to right upper chest, resolved with rest. 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 Patient willing and agreeable to admission and treatment at this time. 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 This is a 64M with a known history of an anomalous coronary artery since seen at [facility] in 2015 but at that time did not have insurance and was lost to follow up. In 2018, the patient saw Dr Morales and underwent CTA and was recommended to undergo open heart surgery for an anomalous coronary artery but still did not have insurance and did not follow up. Patient again saw Dr Morales in the office recently with complaints of occasional chest discomfort. Patient states it happens at times with exertion, last incident was 2 weeks ago, with central chest discomfort and palpitations, at time radiating to right upper chest, resolved with rest. 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 Patient willing and agreeable to admission and treatment at this time. 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

## 2019-08-29 NOTE — PROGRESS NOTE ADULT - SUBJECTIVE AND OBJECTIVE BOX
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 In 2018, the patient saw Dr Morales and underwent testing and stress test and was recommended to undergo open heart surgery for an anomalous coronary artery but still did not have insurance and did not follow up. Patient again saw Dr Morales in the office today with complaints of occasional chest discomfort. Patient states it happens at times with exertion, last incident was 2 weeks ago, with central chest discomfort and palpitations, at time radiating to right upper chest, resolved with rest. 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 Patient willing and agreeable to admission and treatment at this time. Patient denies SOB, LE edema, N/V. (27 Aug 2019 19:40)    ROS: All review of systems negative unless indicated otherwise below.     Lab Results:  CBC  CBC Full  -  ( 29 Aug 2019 05:50 )  WBC Count : 7.39 K/uL  RBC Count : 5.45 M/uL  Hemoglobin : 16.7 g/dL  Hematocrit : 50.3 %  Platelet Count - Automated : 235 K/uL  Mean Cell Volume : 92.3 fl  Mean Cell Hemoglobin : 30.6 pg  Mean Cell Hemoglobin Concentration : 33.2 gm/dL  Auto Neutrophil # : x  Auto Lymphocyte # : x  Auto Monocyte # : x  Auto Eosinophil # : x  Auto Basophil # : x  Auto Neutrophil % : x  Auto Lymphocyte % : x  Auto Monocyte % : x  Auto Eosinophil % : x  Auto Basophil % : x    Chemistry                        16.7   7.39  )-----------( 235      ( 29 Aug 2019 05:50 )             50.3     08    136  |  101  |  27.0<H>  ----------------------------<  95  4.8   |  26.0  |  1.18    Ca    9.2      29 Aug 2019 05:50  Mg     1.9         TPro  7.0  /  Alb  3.7  /  TBili  0.5  /  DBili  x   /  AST  26  /  ALT  20  /  AlkPhos  66  08-28    LIVER FUNCTIONS - ( 28 Aug 2019 06:05 )  Alb: 3.7 g/dL / Pro: 7.0 g/dL / ALK PHOS: 66 U/L / ALT: 20 U/L / AST: 26 U/L / GGT: x           PT/INR - ( 27 Aug 2019 18:50 )   PT: 12.4 sec;   INR: 1.08 ratio    PTT - ( 27 Aug 2019 18:50 )  PTT:33.0 sec  Urinalysis Basic - ( 28 Aug 2019 05:43 )    Color: Yellow / Appearance: Clear / S.010 / pH: x  Gluc: x / Ketone: Negative  / Bili: Negative / Urobili: Negative mg/dL   Blood: x / Protein: Negative mg/dL / Nitrite: Negative   Leuk Esterase: Negative / RBC: x / WBC x   Sq Epi: x / Non Sq Epi: x / Bacteria: x    CARDIAC MARKERS ( 28 Aug 2019 06:05 )  x     / <0.01 ng/mL / 505 U/L / x     / 5.8 ng/mL  CARDIAC MARKERS ( 27 Aug 2019 18:51 )  x     / <0.01 ng/mL / 608 U/L / x     / 5.8 ng/mL    CATH REPORT:   < from: Cardiac Cath Lab - Adult (19 @ 10:31) >  VENTRICLES: Global left ventricular function was normal. EF calculated by  contrast ventriculography was 60 %.  CORONARY VESSELS:The coronary circulation is co-dominant.  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  LAD:   --  LAD: Normal.  CX:   --  Circumflex: Normal.  RCA:   --  RCA: Normal.  COMPLICATIONS: No complications occurred during the cath lab visit.  DIAGNOSTIC IMPRESSIONS: Left ventricular function is normal. 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 Unclear on  cath regarding how it traverses. (between aorta/PA)  DIAGNOSTIC RECOMMENDATIONS: Further plans as per primary cardiologist and  CT surgery.  Prepared and signed by  Luis Eduardo Lopez MD    < end of copied text >    MEDICATIONS  (STANDING):  aspirin enteric coated 81 milliGRAM(s) Oral daily  atorvastatin 40 milliGRAM(s) Oral at bedtime  chlorhexidine 0.12% Liquid 15 milliLiter(s) Swish and Spit two times a day  chlorhexidine 4% Liquid 1 Application(s) Topical two times a day  docusate sodium 100 milliGRAM(s) Oral three times a day  pantoprazole    Tablet 40 milliGRAM(s) Oral before breakfast  sodium chloride 0.9% lock flush 3 milliLiter(s) IV Push every 8 hours    PHYSICAL EXAM:  Vital Signs Last 24 Hrs  T(C): 36.7 (29 Aug 2019 10:18), Max: 36.9 (28 Aug 2019 15:00)  T(F): 98 (29 Aug 2019 10:18), Max: 98.4 (28 Aug 2019 15:00)  HR: 82 (29 Aug 2019 10:18) (50 - 100)  BP: 119/71 (29 Aug 2019 10:18) (96/66 - 122/82)  BP(mean): --  RR: 18 (29 Aug 2019 10:18) (16 - 18)  SpO2: 99% (29 Aug 2019 10:18) (97% - 99%)    GENERAL: NAD, well-groomed, well-developed  HEAD:  Atraumatic, Normocephalic  NECK: Supple, No JVD  NERVOUS SYSTEM:  Alert & Oriented X3, Good concentration; Motor Strength 5/5 B/L upper and lower extremities, sensation intact  CHEST/LUNG: Clear to auscultation bilaterally, No rales, rhonchi, wheezing, or rubs  HEART: Regular rate and rhythm; s1 and s2 auscultated, No murmurs, rubs, or gallops  ABDOMEN: Soft, Nontender, Nondistended; Bowel sounds present and normoactive.   EXTREMITIES:  2+ Peripheral Pulses, No clubbing, cyanosis, or edema  SKIN: No rashes or lesions  CATH SITE: Right groin benign s/p cath.  No bleeding, no ecchymosis, no hematoma. Extremity Warm to touch, with palpable distal pulses, and brisk capillary refill. 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 In 2018, the patient saw Dr Morales and underwent  CTA and was recommended to undergo open heart surgery for an anomalous coronary artery but still did not have insurance and did not follow up. Patient again saw Dr Morales in the office today with complaints of occasional chest discomfort. Patient states it happens at times with exertion, last incident was 2 weeks ago, with central chest discomfort and palpitations, at time radiating to right upper chest, resolved with rest. 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 Patient willing and agreeable to admission and treatment at this time. Patient denies SOB, LE edema, N/V. (27 Aug 2019 19:40)    ROS: All review of systems negative unless indicated otherwise below.     Lab Results:  CBC  CBC Full  -  ( 29 Aug 2019 05:50 )  WBC Count : 7.39 K/uL  RBC Count : 5.45 M/uL  Hemoglobin : 16.7 g/dL  Hematocrit : 50.3 %  Platelet Count - Automated : 235 K/uL  Mean Cell Volume : 92.3 fl  Mean Cell Hemoglobin : 30.6 pg  Mean Cell Hemoglobin Concentration : 33.2 gm/dL  Auto Neutrophil # : x  Auto Lymphocyte # : x  Auto Monocyte # : x  Auto Eosinophil # : x  Auto Basophil # : x  Auto Neutrophil % : x  Auto Lymphocyte % : x  Auto Monocyte % : x  Auto Eosinophil % : x  Auto Basophil % : x    Chemistry                        16.7   7.39  )-----------( 235      ( 29 Aug 2019 05:50 )             50.3     08    136  |  101  |  27.0<H>  ----------------------------<  95  4.8   |  26.0  |  1.18    Ca    9.2      29 Aug 2019 05:50  Mg     1.9         TPro  7.0  /  Alb  3.7  /  TBili  0.5  /  DBili  x   /  AST  26  /  ALT  20  /  AlkPhos  66  08-28    LIVER FUNCTIONS - ( 28 Aug 2019 06:05 )  Alb: 3.7 g/dL / Pro: 7.0 g/dL / ALK PHOS: 66 U/L / ALT: 20 U/L / AST: 26 U/L / GGT: x           PT/INR - ( 27 Aug 2019 18:50 )   PT: 12.4 sec;   INR: 1.08 ratio    PTT - ( 27 Aug 2019 18:50 )  PTT:33.0 sec  Urinalysis Basic - ( 28 Aug 2019 05:43 )    Color: Yellow / Appearance: Clear / S.010 / pH: x  Gluc: x / Ketone: Negative  / Bili: Negative / Urobili: Negative mg/dL   Blood: x / Protein: Negative mg/dL / Nitrite: Negative   Leuk Esterase: Negative / RBC: x / WBC x   Sq Epi: x / Non Sq Epi: x / Bacteria: x    CARDIAC MARKERS ( 28 Aug 2019 06:05 )  x     / <0.01 ng/mL / 505 U/L / x     / 5.8 ng/mL  CARDIAC MARKERS ( 27 Aug 2019 18:51 )  x     / <0.01 ng/mL / 608 U/L / x     / 5.8 ng/mL    CATH REPORT:   < from: Cardiac Cath Lab - Adult (19 @ 10:31) >  VENTRICLES: Global left ventricular function was normal. EF calculated by  contrast ventriculography was 60 %.  CORONARY VESSELS:The coronary circulation is co-dominant.  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  LAD:   --  LAD: Normal.  CX:   --  Circumflex: Normal.  RCA:   --  RCA: Normal.  COMPLICATIONS: No complications occurred during the cath lab visit.  DIAGNOSTIC IMPRESSIONS: Left ventricular function is normal. 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 Unclear on  cath regarding how it traverses. (between aorta/PA)  DIAGNOSTIC RECOMMENDATIONS: Further plans as per primary cardiologist and  CT surgery.  Prepared and signed by  Luis Eduardo Lopez MD    < end of copied text >    MEDICATIONS  (STANDING):  aspirin enteric coated 81 milliGRAM(s) Oral daily  atorvastatin 40 milliGRAM(s) Oral at bedtime  chlorhexidine 0.12% Liquid 15 milliLiter(s) Swish and Spit two times a day  chlorhexidine 4% Liquid 1 Application(s) Topical two times a day  docusate sodium 100 milliGRAM(s) Oral three times a day  pantoprazole    Tablet 40 milliGRAM(s) Oral before breakfast  sodium chloride 0.9% lock flush 3 milliLiter(s) IV Push every 8 hours    PHYSICAL EXAM:  Vital Signs Last 24 Hrs  T(C): 36.7 (29 Aug 2019 10:18), Max: 36.9 (28 Aug 2019 15:00)  T(F): 98 (29 Aug 2019 10:18), Max: 98.4 (28 Aug 2019 15:00)  HR: 82 (29 Aug 2019 10:18) (50 - 100)  BP: 119/71 (29 Aug 2019 10:18) (96/66 - 122/82)  BP(mean): --  RR: 18 (29 Aug 2019 10:18) (16 - 18)  SpO2: 99% (29 Aug 2019 10:18) (97% - 99%)    GENERAL: NAD, well-groomed, well-developed  HEAD:  Atraumatic, Normocephalic  NECK: Supple, No JVD  NERVOUS SYSTEM:  Alert & Oriented X3, Good concentration; Motor Strength 5/5 B/L upper and lower extremities, sensation intact  CHEST/LUNG: Clear to auscultation bilaterally, No rales, rhonchi, wheezing, or rubs  HEART: Regular rate and rhythm; s1 and s2 auscultated, No murmurs, rubs, or gallops  ABDOMEN: Soft, Nontender, Nondistended; Bowel sounds present and normoactive.   EXTREMITIES:  2+ Peripheral Pulses, No clubbing, cyanosis, or edema  SKIN: No rashes or lesions  CATH SITE: Right groin benign s/p cath.  No bleeding, no ecchymosis, no hematoma. Extremity Warm to touch, with palpable distal pulses, and brisk capillary refill.

## 2019-08-29 NOTE — PROGRESS NOTE ADULT - PROBLEM SELECTOR PLAN 1
Cardiac surgery pre-operative work up ongoing  TTE completed   Carotid US b/l pending  Spirometry completed  A1C resulted, patient is non-diabetic  Likely not going to surgery tomorrow but will followup with Dr Whiteside this afternoon regarding plan

## 2019-08-30 ENCOUNTER — TRANSCRIPTION ENCOUNTER (OUTPATIENT)
Age: 64
End: 2019-08-30

## 2019-08-30 VITALS
OXYGEN SATURATION: 99 % | DIASTOLIC BLOOD PRESSURE: 81 MMHG | RESPIRATION RATE: 18 BRPM | TEMPERATURE: 98 F | SYSTOLIC BLOOD PRESSURE: 117 MMHG | HEART RATE: 71 BPM

## 2019-08-30 PROCEDURE — 85027 COMPLETE CBC AUTOMATED: CPT

## 2019-08-30 PROCEDURE — 84484 ASSAY OF TROPONIN QUANT: CPT

## 2019-08-30 PROCEDURE — 93880 EXTRACRANIAL BILAT STUDY: CPT

## 2019-08-30 PROCEDURE — 86850 RBC ANTIBODY SCREEN: CPT

## 2019-08-30 PROCEDURE — 86803 HEPATITIS C AB TEST: CPT

## 2019-08-30 PROCEDURE — C8929: CPT

## 2019-08-30 PROCEDURE — 86901 BLOOD TYPING SEROLOGIC RH(D): CPT

## 2019-08-30 PROCEDURE — 71045 X-RAY EXAM CHEST 1 VIEW: CPT

## 2019-08-30 PROCEDURE — 80048 BASIC METABOLIC PNL TOTAL CA: CPT

## 2019-08-30 PROCEDURE — 85610 PROTHROMBIN TIME: CPT

## 2019-08-30 PROCEDURE — 82553 CREATINE MB FRACTION: CPT

## 2019-08-30 PROCEDURE — 86923 COMPATIBILITY TEST ELECTRIC: CPT

## 2019-08-30 PROCEDURE — 71046 X-RAY EXAM CHEST 2 VIEWS: CPT

## 2019-08-30 PROCEDURE — 80053 COMPREHEN METABOLIC PANEL: CPT

## 2019-08-30 PROCEDURE — C1894: CPT

## 2019-08-30 PROCEDURE — 99152 MOD SED SAME PHYS/QHP 5/>YRS: CPT

## 2019-08-30 PROCEDURE — 94010 BREATHING CAPACITY TEST: CPT

## 2019-08-30 PROCEDURE — 84439 ASSAY OF FREE THYROXINE: CPT

## 2019-08-30 PROCEDURE — C1887: CPT

## 2019-08-30 PROCEDURE — 85730 THROMBOPLASTIN TIME PARTIAL: CPT

## 2019-08-30 PROCEDURE — 36415 COLL VENOUS BLD VENIPUNCTURE: CPT

## 2019-08-30 PROCEDURE — C1769: CPT

## 2019-08-30 PROCEDURE — 99285 EMERGENCY DEPT VISIT HI MDM: CPT | Mod: 25

## 2019-08-30 PROCEDURE — 81003 URINALYSIS AUTO W/O SCOPE: CPT

## 2019-08-30 PROCEDURE — 82550 ASSAY OF CK (CPK): CPT

## 2019-08-30 PROCEDURE — 93458 L HRT ARTERY/VENTRICLE ANGIO: CPT

## 2019-08-30 PROCEDURE — 87641 MR-STAPH DNA AMP PROBE: CPT

## 2019-08-30 PROCEDURE — 86900 BLOOD TYPING SEROLOGIC ABO: CPT

## 2019-08-30 PROCEDURE — 87640 STAPH A DNA AMP PROBE: CPT

## 2019-08-30 PROCEDURE — 80061 LIPID PANEL: CPT

## 2019-08-30 PROCEDURE — 83735 ASSAY OF MAGNESIUM: CPT

## 2019-08-30 PROCEDURE — 86703 HIV-1/HIV-2 1 RESULT ANTBDY: CPT

## 2019-08-30 PROCEDURE — 83036 HEMOGLOBIN GLYCOSYLATED A1C: CPT

## 2019-08-30 PROCEDURE — 83880 ASSAY OF NATRIURETIC PEPTIDE: CPT

## 2019-08-30 PROCEDURE — 99238 HOSP IP/OBS DSCHRG MGMT 30/<: CPT

## 2019-08-30 PROCEDURE — 84134 ASSAY OF PREALBUMIN: CPT

## 2019-08-30 PROCEDURE — 93005 ELECTROCARDIOGRAM TRACING: CPT

## 2019-08-30 PROCEDURE — 84443 ASSAY THYROID STIM HORMONE: CPT

## 2019-08-30 RX ORDER — METOPROLOL TARTRATE 50 MG
0.5 TABLET ORAL
Qty: 30 | Refills: 1
Start: 2019-08-30 | End: 2019-10-28

## 2019-08-30 RX ORDER — METOPROLOL TARTRATE 50 MG
12.5 TABLET ORAL
Refills: 0 | Status: DISCONTINUED | OUTPATIENT
Start: 2019-08-30 | End: 2019-08-30

## 2019-08-30 RX ORDER — ATORVASTATIN CALCIUM 80 MG/1
1 TABLET, FILM COATED ORAL
Qty: 30 | Refills: 1
Start: 2019-08-30 | End: 2019-10-28

## 2019-08-30 RX ORDER — ASPIRIN/CALCIUM CARB/MAGNESIUM 324 MG
1 TABLET ORAL
Qty: 30 | Refills: 1
Start: 2019-08-30 | End: 2019-10-28

## 2019-08-30 RX ADMIN — Medication 12.5 MILLIGRAM(S): at 11:39

## 2019-08-30 RX ADMIN — SODIUM CHLORIDE 3 MILLILITER(S): 9 INJECTION INTRAMUSCULAR; INTRAVENOUS; SUBCUTANEOUS at 13:54

## 2019-08-30 RX ADMIN — PANTOPRAZOLE SODIUM 40 MILLIGRAM(S): 20 TABLET, DELAYED RELEASE ORAL at 05:56

## 2019-08-30 RX ADMIN — SODIUM CHLORIDE 3 MILLILITER(S): 9 INJECTION INTRAMUSCULAR; INTRAVENOUS; SUBCUTANEOUS at 05:53

## 2019-08-30 RX ADMIN — Medication 81 MILLIGRAM(S): at 11:39

## 2019-08-30 RX ADMIN — Medication 100 MILLIGRAM(S): at 13:57

## 2019-08-30 NOTE — DISCHARGE NOTE PROVIDER - CARE PROVIDER_API CALL
Vishnu Whiteside)  CTS Surgery  [address] [place] [address]  Phone: [phone]  Fax: [phone]  Follow Up Time:     Jerman Madrigal)  Cardiovascular Disease  [address]  [place] [address]  Phone: [phone]  Fax: [phone]  Follow Up Time:

## 2019-08-30 NOTE — DISCHARGE NOTE PROVIDER - NSDCFUADDAPPT_GEN_ALL_CORE_FT
1. Dr. Madrigal in one week.  2. Dr. Whiteside's office will contact you regarding surgical plan in next week or two.

## 2019-08-30 NOTE — DISCHARGE NOTE PROVIDER - NSDCCPCAREPLAN_GEN_ALL_CORE_FT
PRINCIPAL DISCHARGE DIAGNOSIS  Diagnosis: Cardiac anomaly  Assessment and Plan of Treatment: Dr. Whiteside's office will contact you within the next 1-2 weeks to discuss surgical plan.  Please call the Cardiothoracic Surgery office at [phone] if you are experiencing any shortness of breath, chest pain, fevers or chills, drainage from the incisions, persistent nausea, vomiting or if you have any questions about your medications. If the symptoms are severe, call 911 and go to the nearest hospital.   Please follow up with cardiology in one week.  Do NOT return to work until cleared by Dr. Whiteside or cardiology.      SECONDARY DISCHARGE DIAGNOSES  Diagnosis: Cardiac anomaly  Assessment and Plan of Treatment:

## 2019-08-30 NOTE — DISCHARGE NOTE NURSING/CASE MANAGEMENT/SOCIAL WORK - PATIENT PORTAL LINK FT
You can access the FollowMyHealth Patient Portal offered by [facility] by registering at the following website: [email or web address]. By joining [facility]’s FollowMyHealth portal, you will also be able to view your health information using other applications (apps) compatible with our system.

## 2019-08-30 NOTE — PROGRESS NOTE ADULT - PROBLEM SELECTOR PLAN 1
TTE/cardotids/PFTs complete and reviewed  pending plan by Dr. Whiteside  lengthy discussion with this PA, Dr. Whiteside, patient and patient's sister Lachelle (via speakerphone) regarding options  cath and CTA images uploaded to discs and to be sent to [facility] for evaluation and further surgical plan  patient and family aware and understand  Dr. Whiteside to speak to cardiology to discuss possible d/c home vs staying in house until surgical plan coordinated  d/w cardiology about starting low dose beta blockers  d/w Dr. Whiteside in AM rounds

## 2019-08-30 NOTE — PROGRESS NOTE ADULT - ASSESSMENT
64M pmhx spinal surgery and known anomalous left coronary artery since 2015 presents to [facility] on 8/27/19 after being sent in to the ER by cardiologist Dr. Morales for reports of exertional chest pain, cardiac cath 8/28 with L coronary artery arising from right cusp, undergoing surgical work up;

## 2019-08-30 NOTE — PROGRESS NOTE ADULT - SUBJECTIVE AND OBJECTIVE BOX
Brief Hospital Course: 64M, known anomalous Left coronary artery sent in with from cardiologist office with c/o exertional chest pain, relieved at rest, underwent cardiac cath 8/28 which showed L coronary artery arising from right cusp.    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 In 2018, the patient saw Dr Morales and underwent testing and stress test and was recommended to undergo open heart surgery for an anomalous coronary artery but still did not have insurance and did not follow up. Patient again saw Dr Morales in the office today with complaints of occasional chest discomfort. Patient states it happens at times with exertion, last incident was 2 weeks ago, with central chest discomfort and palpitations, at time radiating to right upper chest, resolved with rest. 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 Patient willing and agreeable to admission and treatment at this time. Patient denies SOB, LE edema, N/V. (27 Aug 2019 19:40)    PAST MEDICAL & SURGICAL HISTORY:  Anomalous left coronary artery  H/O Spinal surgery: Patient reports requiring surgery on 3 vertebrae following accident many years ago    FAMILY HISTORY:  Family history unknown    Vitals   ICU Vital Signs Last 24 Hrs  T(C): 36.8 (30 Aug 2019 05:55), Max: 36.9 (29 Aug 2019 16:20)  T(F): 98.2 (30 Aug 2019 05:55), Max: 98.5 (29 Aug 2019 16:20)  HR: 88 (30 Aug 2019 05:55) (82 - 88)  BP: 112/64 (30 Aug 2019 05:55) (112/64 - 124/68)  BP(mean): --  ABP: --  ABP(mean): --  RR: 18 (30 Aug 2019 05:55) (18 - 18)  SpO2: 99% (30 Aug 2019 05:55) (99% - 99%)    I&O's Detail    29 Aug 2019 07:01  -  30 Aug 2019 07:00  --------------------------------------------------------  IN:    Oral Fluid: 240 mL  Total IN: 240 mL    OUT:  Total OUT: 0 mL    Total NET: 240 mL    LABS                        16.7   7.39  )-----------( 235      ( 29 Aug 2019 05:50 )             50.3     08-29    136  |  101  |  27.0<H>  ----------------------------<  95  4.8   |  26.0  |  1.18    Ca    9.2      29 Aug 2019 05:50  Mg     1.9     08-29    MEDICATIONS  (STANDING):  aspirin enteric coated 81 milliGRAM(s) Oral daily  atorvastatin 40 milliGRAM(s) Oral at bedtime  chlorhexidine 0.12% Liquid 15 milliLiter(s) Swish and Spit two times a day  chlorhexidine 4% Liquid 1 Application(s) Topical two times a day  docusate sodium 100 milliGRAM(s) Oral three times a day  pantoprazole    Tablet 40 milliGRAM(s) Oral before breakfast  sodium chloride 0.9% lock flush 3 milliLiter(s) IV Push every 8 hours    MEDICATIONS  (PRN):    Allergies:  No Known Allergies    Physical Exam:   Constitutional: NAD, well-groomed, well-developed  HEENT: PERRLA, EOMI, no drainage or redness  Neck: supple,  No JVD  Respiratory: diminished breath sounds b/l  Cardiovascular: S1S2 RRR, no murmurs  Gastrointestinal: + BS soft NT ND  Extremities: PERKINS x 4, no peripheral edema, no cyanosis, no clubbing   Vascular: 2+ radial and DP pulses b/l  Neurological: A+O x 3, speech clear and intact, non focal  Psychiatric: calm, normal mood, normal affect  Skin: warm, dry, well perfused, no rashes    E/M TIME SPENT:   32 minutes of noncontinuous time spent   Evaluating/treating patient, reviewing data/labs/imaging, discussing case with multidisciplinary team, discussing plan/goals of care with patient/family about patient's condition . Non-inclusive of procedure time.   greater than 50% of time spent educating patient about condition, medication and prognosis.

## 2019-08-30 NOTE — DISCHARGE NOTE PROVIDER - HOSPITAL COURSE
64M, pmhx spinal surgery, known anomalous left coronary artery (dx 2015), evaluated in late 2018 for surgical options, however lost to follow up due to insurance issues. Pt sent from his cardiologist office for reports of stable angina, with exertion at work such as lifting boxes, resolves with rest. Pt underwent cardiac catheterization on 8/28/19 which showed the left coronary artery arises from the right cusp.  Pt underwent preop work up but due to uncommon nature of his etiology, surgical options are being discussed with Dr. Whiteside and other cardiac surgeons.  Pt was started on lopressor and tolerating well. he is ambulating around without chest pain/SOB/diaphoresis/palpitations.  He is stable for discharge home as per Dr. Whiteside and cardiology. CT surgery office with call patient to arrange further surgical plan. Pt and sister Lachelle educated on importance of follow up.

## 2021-08-30 PROBLEM — Q24.5 MALFORMATION OF CORONARY VESSELS: Chronic | Status: ACTIVE | Noted: 2019-08-27

## 2021-09-10 ENCOUNTER — APPOINTMENT (OUTPATIENT)
Dept: CARDIOTHORACIC SURGERY | Facility: CLINIC | Age: 66
End: 2021-09-10

## 2021-09-23 NOTE — HISTORY OF PRESENT ILLNESS
[FreeTextEntry1] : Mr. FABIAN is a 66 year old male referred by Dr. Madrigal who presents for consultation. His past medical history includes anomalous coronary artery. \par \par He presents to the office today for follow up care.\par \par \par

## 2021-09-23 NOTE — CONSULT LETTER
[Dear  ___] : Dear  [unfilled], [Courtesy Letter:] : I had the pleasure of seeing your patient, [unfilled], in my office today. [Please see my note below.] : Please see my note below. [Consult Closing:] : Thank you very much for allowing me to participate in the care of this patient.  If you have any questions, please do not hesitate to contact me. [Sincerely,] : Sincerely, [FreeTextEntry2] : Jerman Madrigal MD [FreeTextEntry3] : Vishnu Whiteside MD\par Cardiothoracic Surgery\par [facility]\[place] [address]\par [phone]\par

## 2021-09-24 ENCOUNTER — APPOINTMENT (OUTPATIENT)
Dept: CARDIOTHORACIC SURGERY | Facility: CLINIC | Age: 66
End: 2021-09-24

## 2022-09-07 ENCOUNTER — OUTPATIENT (OUTPATIENT)
Dept: OUTPATIENT SERVICES | Facility: HOSPITAL | Age: 67
LOS: 1 days | End: 2022-09-07
Payer: COMMERCIAL

## 2022-09-07 DIAGNOSIS — Z98.890 OTHER SPECIFIED POSTPROCEDURAL STATES: Chronic | ICD-10-CM

## 2022-09-07 DIAGNOSIS — Q24.9 CONGENITAL MALFORMATION OF HEART, UNSPECIFIED: ICD-10-CM

## 2022-09-07 PROCEDURE — 93306 TTE W/DOPPLER COMPLETE: CPT

## 2022-09-07 PROCEDURE — 93306 TTE W/DOPPLER COMPLETE: CPT | Mod: 26

## 2023-07-27 ENCOUNTER — OFFICE (OUTPATIENT)
Dept: URBAN - METROPOLITAN AREA CLINIC 94 | Facility: CLINIC | Age: 68
Setting detail: OPHTHALMOLOGY
End: 2023-07-27
Payer: MEDICARE

## 2023-07-27 DIAGNOSIS — H43.393: ICD-10-CM

## 2023-07-27 DIAGNOSIS — H04.123: ICD-10-CM

## 2023-07-27 DIAGNOSIS — Z96.1: ICD-10-CM

## 2023-07-27 PROBLEM — H26.493 POSTERIOR CAPSULE OPACITY; BOTH EYES: Status: ACTIVE | Noted: 2023-07-27

## 2023-07-27 PROCEDURE — 92250 FUNDUS PHOTOGRAPHY W/I&R: CPT | Performed by: OPHTHALMOLOGY

## 2023-07-27 PROCEDURE — 92014 COMPRE OPH EXAM EST PT 1/>: CPT | Performed by: OPHTHALMOLOGY

## 2023-07-27 ASSESSMENT — SPHEQUIV_DERIVED
OD_SPHEQUIV: 0.5
OD_SPHEQUIV: 1.125
OS_SPHEQUIV: -1.25
OD_SPHEQUIV: -7.5
OS_SPHEQUIV: 0.75
OS_SPHEQUIV: 0.5

## 2023-07-27 ASSESSMENT — VISUAL ACUITY
OD_BCVA: 20/30+
OS_BCVA: 20/50

## 2023-07-27 ASSESSMENT — REFRACTION_CURRENTRX
OD_OVR_VA: 20/
OS_ADD: +2.50
OD_CYLINDER: -1.25
OS_VPRISM_DIRECTION: PROGS
OD_AXIS: 021
OS_OVR_VA: 20/
OS_SPHERE: -1.00
OD_ADD: +2.00
OS_CYLINDER: -0.50
OD_SPHERE: -3.75
OD_VPRISM_DIRECTION: PROGS
OS_AXIS: 090

## 2023-07-27 ASSESSMENT — AXIALLENGTH_DERIVED
OS_AL: 21.9975
OD_AL: 21.7606
OD_AL: 21.5554
OS_AL: 22.6067
OD_AL: 24.78
OS_AL: 21.9132

## 2023-07-27 ASSESSMENT — CONFRONTATIONAL VISUAL FIELD TEST (CVF)
OS_FINDINGS: FULL
OD_FINDINGS: FULL

## 2023-07-27 ASSESSMENT — REFRACTION_MANIFEST
OD_SPHERE: -6.50
OS_CYLINDER: -0.50
OS_SPHERE: -1.00
OS_VA1: 20/25-
OU_VA: 20/30-
OS_VA1: 20/30
OS_ADD: +2.50
OD_AXIS: 25
OD_ADD: +2.50
OD_VA1: 20/40-
OD_SPHERE: +1.75
OD_CYLINDER: -1.25
OS_ADD: +2.75
OS_SPHERE: +1.00
OS_AXIS: 105
OD_CYLINDER: -2.00
OS_AXIS: 107
OS_CYLINDER: -0.50
OD_VA1: 20/30-
OD_ADD: +2.75
OD_AXIS: 032

## 2023-07-27 ASSESSMENT — SUPERFICIAL PUNCTATE KERATITIS (SPK)
OD_SPK: T
OS_SPK: T

## 2023-07-27 ASSESSMENT — REFRACTION_AUTOREFRACTION
OS_AXIS: 0
OD_CYLINDER: -1.50
OS_SPHERE: +0.50
OS_CYLINDER: 0.00
OD_AXIS: 029
OD_SPHERE: +1.25

## 2023-07-27 ASSESSMENT — KERATOMETRY
OS_K1POWER_DIOPTERS: 47.25
OD_K2POWER_DIOPTERS: 49.25
OS_K2POWER_DIOPTERS: 48.00
OS_AXISANGLE_DEGREES: 115
OD_K1POWER_DIOPTERS: 47.50
OD_AXISANGLE_DEGREES: 108

## 2023-07-27 ASSESSMENT — TONOMETRY
OD_IOP_MMHG: 14
OS_IOP_MMHG: 11

## 2023-08-23 ENCOUNTER — OFFICE (OUTPATIENT)
Dept: URBAN - METROPOLITAN AREA CLINIC 94 | Facility: CLINIC | Age: 68
Setting detail: OPHTHALMOLOGY
End: 2023-08-23
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
## 2023-08-23 PROCEDURE — NO SHOW FE NO SHOW FEE: Performed by: OPHTHALMOLOGY

## 2023-09-14 NOTE — H&P ADULT - NEGATIVE GENERAL SYMPTOMS
----- Message from Sergio Mckay CNP sent at 9/14/2023  8:19 AM CDT -----  Please notify the patient of normal results.  Follow-up as planned.   no sweating/no chills/no fatigue/no weight gain/no polyphagia/no fever

## 2023-11-22 ENCOUNTER — OFFICE (OUTPATIENT)
Dept: URBAN - METROPOLITAN AREA CLINIC 94 | Facility: CLINIC | Age: 68
Setting detail: OPHTHALMOLOGY
End: 2023-11-22
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
## 2023-11-22 PROCEDURE — NO SHOW FE NO SHOW FEE: Performed by: OPHTHALMOLOGY

## 2024-03-19 NOTE — ED PROVIDER NOTE - CPE EDP SKIN NORM
Detail Level: Generalized
Continue Regimen: Clindamycin swabs qAM\\n\\ntretinoin 0.1 % topical cream \\nSig: Apply to affected areas qhs as tolerated.
Initiate Treatment: doxycycline hyclate 100 mg tablet \\nQuantity: 60.0 Tablet  Days Supply: 60\\nSig: Take 1 po qd in the morning with 8 oz of water. do not lay down 30 min after taking medication, avoid dairy products.
Plan: Patient counseled on accutane & given handout in office. Tried to get pt signed up today but was unable to due to a technical issue. Patient to call ipledge and re-enroll, for now he will finish 2 more months of doxy.
normal...

## 2025-01-17 ENCOUNTER — OFFICE (OUTPATIENT)
Dept: URBAN - METROPOLITAN AREA CLINIC 94 | Facility: CLINIC | Age: 70
Setting detail: OPHTHALMOLOGY
End: 2025-01-17
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
## 2025-01-17 PROCEDURE — NO SHOW FE NO SHOW FEE: Performed by: OPHTHALMOLOGY

## 2025-03-25 ENCOUNTER — OFFICE (OUTPATIENT)
Dept: URBAN - METROPOLITAN AREA CLINIC 63 | Facility: CLINIC | Age: 70
Setting detail: OPHTHALMOLOGY
End: 2025-03-25
Payer: MEDICARE

## 2025-03-25 DIAGNOSIS — H26.493: ICD-10-CM

## 2025-03-25 DIAGNOSIS — H43.393: ICD-10-CM

## 2025-03-25 DIAGNOSIS — H04.123: ICD-10-CM

## 2025-03-25 DIAGNOSIS — Z96.1: ICD-10-CM

## 2025-03-25 PROBLEM — H26.492 POSTERIOR CAPSULE OPACITY; RIGHT EYE, LEFT EYE, BOTH EYES: Status: ACTIVE | Noted: 2025-03-25

## 2025-03-25 PROBLEM — H26.491 POSTERIOR CAPSULE OPACITY; RIGHT EYE, LEFT EYE, BOTH EYES: Status: ACTIVE | Noted: 2025-03-25

## 2025-03-25 PROCEDURE — 92014 COMPRE OPH EXAM EST PT 1/>: CPT | Performed by: OPHTHALMOLOGY

## 2025-03-25 PROCEDURE — 92250 FUNDUS PHOTOGRAPHY W/I&R: CPT | Performed by: OPHTHALMOLOGY

## 2025-03-25 ASSESSMENT — REFRACTION_MANIFEST
OS_CYLINDER: -0.50
OS_SPHERE: -1.00
OD_VA1: 20/60
OS_SPHERE: +1.00
OD_ADD: +2.50
OS_AXIS: 107
OS_ADD: +2.50
OS_VA1: 20/30
OD_AXIS: 25
OD_CYLINDER: -2.00
OD_CYLINDER: -1.00
OD_VA1: 20/40-
OS_VA1: 20/30
OS_SPHERE: 0.00
OD_SPHERE: +1.75
OD_AXIS: 032
OD_SPHERE: -6.50
OD_CYLINDER: -1.25
OS_AXIS: 105
OD_AXIS: 017
OD_ADD: +2.75
OS_AXIS: 163
OD_VA1: 20/30-
OS_ADD: +2.75
OS_VA1: 20/25-
OS_CYLINDER: -0.50
OU_VA: 20/30-
OS_CYLINDER: -0.50
OD_SPHERE: +1.75

## 2025-03-25 ASSESSMENT — REFRACTION_AUTOREFRACTION
OD_CYLINDER: -1.00
OD_SPHERE: +1.75
OS_AXIS: 163
OD_AXIS: 017
OS_CYLINDER: -0.50
OS_SPHERE: 0.00

## 2025-03-25 ASSESSMENT — VISUAL ACUITY
OS_BCVA: 20/60+1
OD_BCVA: 20/30

## 2025-03-25 ASSESSMENT — REFRACTION_CURRENTRX
OD_VPRISM_DIRECTION: PROGS
OD_ADD: +2.00
OS_OVR_VA: 20/
OD_CYLINDER: -1.25
OS_ADD: +2.50
OS_SPHERE: -1.00
OD_AXIS: 021
OD_SPHERE: -3.75
OS_CYLINDER: -0.50
OS_VPRISM_DIRECTION: PROGS
OS_AXIS: 090
OD_OVR_VA: 20/

## 2025-03-25 ASSESSMENT — CONFRONTATIONAL VISUAL FIELD TEST (CVF)
OD_FINDINGS: FULL
OS_FINDINGS: FULL

## 2025-03-25 ASSESSMENT — KERATOMETRY
OD_AXISANGLE_DEGREES: 109
OS_AXISANGLE_DEGREES: 078
OS_K1POWER_DIOPTERS: 47.50
OS_K2POWER_DIOPTERS: 48.25
OD_K1POWER_DIOPTERS: 47.00
OD_K2POWER_DIOPTERS: 49.25

## 2025-03-25 ASSESSMENT — TONOMETRY
OD_IOP_MMHG: 16
OS_IOP_MMHG: 16

## 2025-03-25 ASSESSMENT — SUPERFICIAL PUNCTATE KERATITIS (SPK)
OD_SPK: T
OS_SPK: T

## 2025-04-30 ENCOUNTER — ASC (OUTPATIENT)
Dept: URBAN - METROPOLITAN AREA SURGERY 8 | Facility: SURGERY | Age: 70
Setting detail: OPHTHALMOLOGY
End: 2025-04-30
Payer: MEDICARE

## 2025-04-30 DIAGNOSIS — H26.491: ICD-10-CM

## 2025-04-30 PROBLEM — H26.492 POSTERIOR CAPSULE OPACITY; RIGHT EYE, LEFT EYE, BOTH EYES: Status: ACTIVE | Noted: 2025-04-30

## 2025-04-30 PROBLEM — H26.493 POSTERIOR CAPSULE OPACITY; RIGHT EYE, LEFT EYE, BOTH EYES: Status: ACTIVE | Noted: 2025-04-30

## 2025-04-30 PROCEDURE — 66821 AFTER CATARACT LASER SURGERY: CPT | Mod: RT | Performed by: OPHTHALMOLOGY

## 2025-05-01 ASSESSMENT — REFRACTION_CURRENTRX
OS_CYLINDER: -0.50
OS_ADD: +2.50
OD_CYLINDER: -1.25
OS_VPRISM_DIRECTION: PROGS
OS_SPHERE: -1.00
OD_SPHERE: -3.75
OS_OVR_VA: 20/
OS_AXIS: 090
OD_ADD: +2.00
OD_VPRISM_DIRECTION: PROGS
OD_AXIS: 021
OD_OVR_VA: 20/

## 2025-05-01 ASSESSMENT — REFRACTION_MANIFEST
OS_CYLINDER: -0.50
OS_AXIS: 163
OS_VA1: 20/30
OS_AXIS: 105
OD_AXIS: 017
OS_VA1: 20/25-
OS_ADD: +2.75
OS_CYLINDER: -0.50
OS_ADD: +2.50
OS_SPHERE: +1.00
OS_CYLINDER: -0.50
OS_VA1: 20/30
OD_AXIS: 032
OD_VA1: 20/60
OD_ADD: +2.75
OD_SPHERE: +1.75
OD_SPHERE: +1.75
OD_SPHERE: -6.50
OS_SPHERE: -1.00
OU_VA: 20/30-
OS_AXIS: 107
OD_VA1: 20/30-
OD_CYLINDER: -1.25
OD_CYLINDER: -1.00
OS_SPHERE: 0.00
OD_ADD: +2.50
OD_CYLINDER: -2.00
OD_AXIS: 25
OD_VA1: 20/40-

## 2025-05-01 ASSESSMENT — KERATOMETRY
OS_K1POWER_DIOPTERS: 47.50
OD_K2POWER_DIOPTERS: 49.25
OD_AXISANGLE_DEGREES: 109
OD_K1POWER_DIOPTERS: 47.00
OS_AXISANGLE_DEGREES: 078
OS_K2POWER_DIOPTERS: 48.25

## 2025-05-01 ASSESSMENT — VISUAL ACUITY
OS_BCVA: 20/60+1
OD_BCVA: 20/30

## 2025-05-16 ENCOUNTER — ASC (OUTPATIENT)
Dept: URBAN - METROPOLITAN AREA SURGERY 8 | Facility: SURGERY | Age: 70
Setting detail: OPHTHALMOLOGY
End: 2025-05-16
Payer: MEDICARE

## 2025-05-16 DIAGNOSIS — H26.492: ICD-10-CM

## 2025-05-16 PROCEDURE — 66821 AFTER CATARACT LASER SURGERY: CPT | Mod: 79,LT | Performed by: OPHTHALMOLOGY

## 2025-05-16 ASSESSMENT — REFRACTION_AUTOREFRACTION
OS_AXIS: 163
OD_SPHERE: +1.75
OD_CYLINDER: -1.00
OD_AXIS: 017
OS_CYLINDER: -0.50
OS_SPHERE: 0.00

## 2025-05-16 ASSESSMENT — REFRACTION_MANIFEST
OD_AXIS: 25
OS_AXIS: 105
OS_VA1: 20/30
OS_CYLINDER: -0.50
OD_SPHERE: -6.50
OS_SPHERE: +1.00
OD_VA1: 20/30-
OU_VA: 20/30-
OS_SPHERE: 0.00
OD_VA1: 20/60
OD_ADD: +2.50
OS_CYLINDER: -0.50
OD_AXIS: 032
OS_ADD: +2.75
OS_CYLINDER: -0.50
OS_AXIS: 107
OD_CYLINDER: -2.00
OD_AXIS: 017
OD_SPHERE: +1.75
OS_VA1: 20/30
OS_SPHERE: -1.00
OS_ADD: +2.50
OD_SPHERE: +1.75
OD_CYLINDER: -1.25
OD_CYLINDER: -1.00
OS_VA1: 20/25-
OS_AXIS: 163
OD_ADD: +2.75
OD_VA1: 20/40-

## 2025-05-16 ASSESSMENT — KERATOMETRY
OS_K2POWER_DIOPTERS: 48.25
OS_AXISANGLE_DEGREES: 078
OD_K1POWER_DIOPTERS: 47.00
OS_K1POWER_DIOPTERS: 47.50
OD_K2POWER_DIOPTERS: 49.25
OD_AXISANGLE_DEGREES: 109

## 2025-05-16 ASSESSMENT — SUPERFICIAL PUNCTATE KERATITIS (SPK)
OD_SPK: T
OS_SPK: T

## 2025-05-16 ASSESSMENT — VISUAL ACUITY
OD_BCVA: 20/30
OS_BCVA: 20/60+1

## 2025-05-16 ASSESSMENT — REFRACTION_CURRENTRX
OD_OVR_VA: 20/
OS_ADD: +2.50
OS_VPRISM_DIRECTION: PROGS
OS_CYLINDER: -0.50
OD_VPRISM_DIRECTION: PROGS
OD_CYLINDER: -1.25
OD_AXIS: 021
OS_SPHERE: -1.00
OD_SPHERE: -3.75
OS_OVR_VA: 20/
OS_AXIS: 090
OD_ADD: +2.00

## 2025-06-13 ENCOUNTER — OFFICE (OUTPATIENT)
Dept: URBAN - METROPOLITAN AREA CLINIC 94 | Facility: CLINIC | Age: 70
Setting detail: OPHTHALMOLOGY
End: 2025-06-13
Payer: MEDICARE

## 2025-06-13 DIAGNOSIS — H16.223: ICD-10-CM

## 2025-06-13 DIAGNOSIS — Z96.1: ICD-10-CM

## 2025-06-13 DIAGNOSIS — H26.493: ICD-10-CM

## 2025-06-13 PROCEDURE — 99024 POSTOP FOLLOW-UP VISIT: CPT | Performed by: OPHTHALMOLOGY

## 2025-06-13 ASSESSMENT — REFRACTION_CURRENTRX
OD_ADD: +2.00
OS_SPHERE: -1.00
OS_CYLINDER: -0.50
OS_VPRISM_DIRECTION: PROGS
OD_CYLINDER: -1.25
OS_AXIS: 090
OD_VPRISM_DIRECTION: PROGS
OS_ADD: +2.50
OD_OVR_VA: 20/
OS_OVR_VA: 20/
OD_AXIS: 021
OD_SPHERE: -3.75

## 2025-06-13 ASSESSMENT — VISUAL ACUITY
OD_BCVA: 20/25
OS_BCVA: 20/40-1

## 2025-06-13 ASSESSMENT — REFRACTION_MANIFEST
OD_VA1: 20/60
OD_CYLINDER: -1.25
OS_CYLINDER: -0.50
OS_AXIS: 163
OD_AXIS: 25
OS_SPHERE: 0.00
OD_VA1: 20/30-
OS_VA1: 20/25-
OS_SPHERE: -1.00
OD_AXIS: 032
OS_ADD: +2.75
OD_SPHERE: +1.75
OS_VA1: 20/30
OD_VA1: 20/40-
OD_SPHERE: +1.75
OU_VA: 20/30-
OD_AXIS: 017
OD_SPHERE: -6.50
OD_ADD: +2.50
OD_CYLINDER: -1.00
OS_AXIS: 105
OS_AXIS: 107
OD_CYLINDER: -2.00
OD_ADD: +2.75
OS_ADD: +2.50
OS_SPHERE: +1.00
OS_VA1: 20/30

## 2025-06-13 ASSESSMENT — KERATOMETRY
OD_K2POWER_DIOPTERS: 49.50
OD_AXISANGLE_DEGREES: 108
OS_K2POWER_DIOPTERS: 48.25
OD_K1POWER_DIOPTERS: 47.25
OS_AXISANGLE_DEGREES: 099
OS_K1POWER_DIOPTERS: 47.50

## 2025-06-13 ASSESSMENT — CONFRONTATIONAL VISUAL FIELD TEST (CVF)
OD_FINDINGS: FULL
OS_FINDINGS: FULL

## 2025-06-13 ASSESSMENT — REFRACTION_AUTOREFRACTION
OS_SPHERE: +0.25
OD_CYLINDER: -1.75
OS_AXIS: 143
OD_AXIS: 025
OS_CYLINDER: -0.25
OD_SPHERE: +1.00

## 2025-06-13 ASSESSMENT — SUPERFICIAL PUNCTATE KERATITIS (SPK)
OS_SPK: T
OD_SPK: T

## 2025-06-13 ASSESSMENT — TONOMETRY
OS_IOP_MMHG: 14
OD_IOP_MMHG: 16